# Patient Record
Sex: FEMALE | Race: WHITE
[De-identification: names, ages, dates, MRNs, and addresses within clinical notes are randomized per-mention and may not be internally consistent; named-entity substitution may affect disease eponyms.]

---

## 2018-05-17 ENCOUNTER — HOSPITAL ENCOUNTER (OUTPATIENT)
Dept: HOSPITAL 50 - VM.SDS | Age: 53
Discharge: HOME | End: 2018-05-17
Attending: FAMILY MEDICINE
Payer: COMMERCIAL

## 2018-05-17 DIAGNOSIS — Z83.71: ICD-10-CM

## 2018-05-17 DIAGNOSIS — E11.9: ICD-10-CM

## 2018-05-17 DIAGNOSIS — K56.2: ICD-10-CM

## 2018-05-17 DIAGNOSIS — I10: ICD-10-CM

## 2018-05-17 DIAGNOSIS — E78.00: ICD-10-CM

## 2018-05-17 DIAGNOSIS — K64.8: ICD-10-CM

## 2018-05-17 DIAGNOSIS — R19.5: Primary | ICD-10-CM

## 2018-05-17 DIAGNOSIS — Z79.4: ICD-10-CM

## 2018-05-17 DIAGNOSIS — K63.5: ICD-10-CM

## 2018-05-17 DIAGNOSIS — Z79.899: ICD-10-CM

## 2018-05-17 DIAGNOSIS — Z87.891: ICD-10-CM

## 2018-05-17 NOTE — OR
REFERRING PROVIDER:  Dr. Kaylyn Ohara.

 

PRE-OPERATIVE DIAGNOSIS:  Positive Cologuard stool card.  This is the patient's

first colonoscopy.  There is a positive family history of polyps in mother, who

she states goes in for colonoscopy every year.

 

POST-OPERATIVE DIAGNOSES:

1. A 4 mm polyp at 15 cm from anal verge, removed with cold forceps.

2. Poor prep despite 2 saline enemas prior to the procedure.  I was unable to

    visualize the colon very well and unable to suction given the residual

    particulate stool present.

3. Tortuous colon.  I was only able to advance to the mid right colon past the

    hepatic flexure.  I believe the entry to the cecum was visualized, but was

    not able to visualize any of the cecum itself.

4. Mild hemorrhoids present.

 

PROCEDURE:  Colonoscopy with polypectomy x1 using cold forceps.  However, scope

was only able to be advanced to the mid right colon just past the hepatic

flexure.

 

SURGEON:  Pete Massey M.D.

 

ANESTHESIA:  Monitored anesthesia care.

 

BOWEL PREP:  Poor despite 2 saline enemas given before the procedure.  Residual

particulate stool still present and prohibited me from suctioning out stool very

well.

 

DESCRIPTION OF PROCEDURE:  Alfreda is a 53-year-old female, brought to the

endoscopy suite after discussing risks and benefits of the procedure.  Informed

consent was obtained for conscious sedation and colonoscopy with or without

biopsy and/or polypectomy.  We also discussed possibility of missed lesions.

Pre-procedure exam was unremarkable.  IV, oxygen, and monitors were placed.  The

patient was placed in the left lateral decubitus position.  Sedation was

administered and a digital rectal exam was performed which was unremarkable.

Colonoscope was passed into the rectum and slowly past the hepatic flexure to

the mid right colon.  The entry to the cecum I believe was visualized, but was

unable to see the cecum itself.  The prep was poor and it was difficult to

suction out much of any of the residual particulate stool present.  This did

clog the scope multiple times.  The colonoscope was slowly withdrawn and the

mucosa was closed observed in a direct circumferential manner.  The viewed

portion of the ascending colon was unremarkable.  Transverse colon was

unremarkable.  Descending colon was unremarkable.  Sigmoid colon revealed a 4 mm

polyp at 15 cm, removed with cold forceps.  Retroflexion was performed.  Rectal

mucosa was remarkable for some mild internal hemorrhoids.  Scope was removed.

The patient tolerated the procedure well.  The patient was monitored until that

baseline status.  Discharge instructions were reviewed and the patient was

discharged in good condition.

 

COMPLICATIONS:  None.

 

TOTAL TIME:  49 minutes.

 

ESTIMATED BLOOD LOSS:  About 1 mL.

 

RECOMMENDATIONS/FOLLOW-UP:  We will await results of path report.  Given the

positive Cologuard test along with poor prep leading to poor visualization, I

would recommend repeating scope within the next 6-12 months.  Also, given the

tortuous nature of the colon and inability to make it all the way to the cecum,

I would recommend having GI attempt the next procedure.  The patient would

benefit for extended length of clear fluids along with possible split prep, as

the full prep caused nausea and vomiting.  I would like to kindly thank Dr. Ohara for this referral.

 

 

DMB:  05/17/2018 12:13:41  MODL:  05/17/2018 18:27:00

Job #:  [id number]

## 2024-09-20 ENCOUNTER — HOSPITAL ENCOUNTER (OUTPATIENT)
Dept: HOSPITAL 50 - VM.SDS | Age: 59
Discharge: HOME | End: 2024-09-20
Attending: SURGERY
Payer: COMMERCIAL

## 2024-09-20 DIAGNOSIS — Z12.11: Primary | ICD-10-CM

## 2024-09-20 DIAGNOSIS — E11.9: ICD-10-CM

## 2024-09-20 DIAGNOSIS — Z86.010: ICD-10-CM

## 2024-09-20 DIAGNOSIS — I10: ICD-10-CM

## 2024-09-20 DIAGNOSIS — Z87.891: ICD-10-CM

## 2024-09-20 DIAGNOSIS — E78.00: ICD-10-CM

## 2024-09-20 DIAGNOSIS — D12.5: ICD-10-CM

## 2024-09-20 DIAGNOSIS — Z79.899: ICD-10-CM

## 2024-09-20 DIAGNOSIS — E04.1: ICD-10-CM

## 2024-09-20 DIAGNOSIS — Z79.84: ICD-10-CM

## 2024-09-20 DIAGNOSIS — Z88.0: ICD-10-CM

## 2024-09-20 DIAGNOSIS — Z80.0: ICD-10-CM

## 2024-09-20 DIAGNOSIS — Z79.82: ICD-10-CM

## 2024-09-20 DIAGNOSIS — Z88.8: ICD-10-CM

## 2024-09-20 DIAGNOSIS — K21.9: ICD-10-CM

## 2024-09-20 LAB — GLUCOSE BLD-MCNC: 78 MG/DL (ref 70–99)
